# Patient Record
Sex: MALE | Race: WHITE | NOT HISPANIC OR LATINO | Employment: FULL TIME | URBAN - METROPOLITAN AREA
[De-identification: names, ages, dates, MRNs, and addresses within clinical notes are randomized per-mention and may not be internally consistent; named-entity substitution may affect disease eponyms.]

---

## 2024-08-15 ENCOUNTER — APPOINTMENT (OUTPATIENT)
Dept: RADIOLOGY | Facility: MEDICAL CENTER | Age: 39
End: 2024-08-15
Attending: EMERGENCY MEDICINE
Payer: COMMERCIAL

## 2024-08-15 ENCOUNTER — HOSPITAL ENCOUNTER (EMERGENCY)
Facility: MEDICAL CENTER | Age: 39
End: 2024-08-15
Attending: EMERGENCY MEDICINE
Payer: COMMERCIAL

## 2024-08-15 VITALS
HEART RATE: 73 BPM | RESPIRATION RATE: 16 BRPM | DIASTOLIC BLOOD PRESSURE: 90 MMHG | BODY MASS INDEX: 34.78 KG/M2 | WEIGHT: 221.56 LBS | SYSTOLIC BLOOD PRESSURE: 146 MMHG | HEIGHT: 67 IN | TEMPERATURE: 97 F | OXYGEN SATURATION: 97 %

## 2024-08-15 DIAGNOSIS — R10.84 GENERALIZED ABDOMINAL PAIN: ICD-10-CM

## 2024-08-15 LAB
ALBUMIN SERPL BCP-MCNC: 4 G/DL (ref 3.2–4.9)
ALBUMIN/GLOB SERPL: 1.5 G/DL
ALP SERPL-CCNC: 70 U/L (ref 30–99)
ALT SERPL-CCNC: 45 U/L (ref 2–50)
ANION GAP SERPL CALC-SCNC: 8 MMOL/L (ref 7–16)
AST SERPL-CCNC: 28 U/L (ref 12–45)
BASOPHILS # BLD AUTO: 0.6 % (ref 0–1.8)
BASOPHILS # BLD: 0.04 K/UL (ref 0–0.12)
BILIRUB SERPL-MCNC: 0.7 MG/DL (ref 0.1–1.5)
BUN SERPL-MCNC: 12 MG/DL (ref 8–22)
CALCIUM ALBUM COR SERPL-MCNC: 8.6 MG/DL (ref 8.5–10.5)
CALCIUM SERPL-MCNC: 8.6 MG/DL (ref 8.5–10.5)
CHLORIDE SERPL-SCNC: 108 MMOL/L (ref 96–112)
CO2 SERPL-SCNC: 23 MMOL/L (ref 20–33)
CREAT SERPL-MCNC: 0.95 MG/DL (ref 0.5–1.4)
EOSINOPHIL # BLD AUTO: 0.18 K/UL (ref 0–0.51)
EOSINOPHIL NFR BLD: 2.8 % (ref 0–6.9)
ERYTHROCYTE [DISTWIDTH] IN BLOOD BY AUTOMATED COUNT: 41.8 FL (ref 35.9–50)
GFR SERPLBLD CREATININE-BSD FMLA CKD-EPI: 105 ML/MIN/1.73 M 2
GLOBULIN SER CALC-MCNC: 2.7 G/DL (ref 1.9–3.5)
GLUCOSE SERPL-MCNC: 94 MG/DL (ref 65–99)
HCT VFR BLD AUTO: 42.8 % (ref 42–52)
HGB BLD-MCNC: 14.2 G/DL (ref 14–18)
IMM GRANULOCYTES # BLD AUTO: 0.01 K/UL (ref 0–0.11)
IMM GRANULOCYTES NFR BLD AUTO: 0.2 % (ref 0–0.9)
LIPASE SERPL-CCNC: 43 U/L (ref 11–82)
LYMPHOCYTES # BLD AUTO: 2.76 K/UL (ref 1–4.8)
LYMPHOCYTES NFR BLD: 42.8 % (ref 22–41)
MCH RBC QN AUTO: 27.8 PG (ref 27–33)
MCHC RBC AUTO-ENTMCNC: 33.2 G/DL (ref 32.3–36.5)
MCV RBC AUTO: 83.8 FL (ref 81.4–97.8)
MONOCYTES # BLD AUTO: 0.75 K/UL (ref 0–0.85)
MONOCYTES NFR BLD AUTO: 11.6 % (ref 0–13.4)
NEUTROPHILS # BLD AUTO: 2.71 K/UL (ref 1.82–7.42)
NEUTROPHILS NFR BLD: 42 % (ref 44–72)
NRBC # BLD AUTO: 0 K/UL
NRBC BLD-RTO: 0 /100 WBC (ref 0–0.2)
PLATELET # BLD AUTO: 226 K/UL (ref 164–446)
PMV BLD AUTO: 10.9 FL (ref 9–12.9)
POTASSIUM SERPL-SCNC: 3.9 MMOL/L (ref 3.6–5.5)
PROT SERPL-MCNC: 6.7 G/DL (ref 6–8.2)
RBC # BLD AUTO: 5.11 M/UL (ref 4.7–6.1)
SODIUM SERPL-SCNC: 139 MMOL/L (ref 135–145)
WBC # BLD AUTO: 6.5 K/UL (ref 4.8–10.8)

## 2024-08-15 PROCEDURE — 99284 EMERGENCY DEPT VISIT MOD MDM: CPT

## 2024-08-15 PROCEDURE — 85025 COMPLETE CBC W/AUTO DIFF WBC: CPT

## 2024-08-15 PROCEDURE — 36415 COLL VENOUS BLD VENIPUNCTURE: CPT

## 2024-08-15 PROCEDURE — 83690 ASSAY OF LIPASE: CPT

## 2024-08-15 PROCEDURE — 80053 COMPREHEN METABOLIC PANEL: CPT

## 2024-08-15 PROCEDURE — 700117 HCHG RX CONTRAST REV CODE 255: Performed by: EMERGENCY MEDICINE

## 2024-08-15 PROCEDURE — 74177 CT ABD & PELVIS W/CONTRAST: CPT

## 2024-08-15 RX ADMIN — IOHEXOL 100 ML: 350 INJECTION, SOLUTION INTRAVENOUS at 09:59

## 2024-08-15 NOTE — ED TRIAGE NOTES
Chief Complaint   Patient presents with    Abdominal Pain     Ambulated in triage reports diffuse abdominal pain x 1 year, describes as sharp, constant, gassy and bloated. Pain worse the last 2 months. +nausea -vomiting. Denies drinking alcohol.     NAD. Denies any past medical history. Educated on triage process. Instructed to notify staff for any worsening symptoms. Denies any recent travel. Denies exposure to known covid positive patients. Denies any respiratory symptoms.

## 2024-08-15 NOTE — ED PROVIDER NOTES
"ED PHYSICIAN NOTE    CHIEF COMPLAINT  Chief Complaint   Patient presents with    Abdominal Pain     Ambulated in triage reports diffuse abdominal pain x 1 year, describes as sharp, constant, gassy and bloated. Pain worse the last 2 months. +nausea -vomiting. Denies drinking alcohol.       EXTERNAL RECORDS REVIEWED  External ED Note patient seen at outside hospital with gastritis, chronic abdominal pain, constipation    \[facility] [place] [facility]\""/NIK Nuñez is a 39 y.o. male who presents with pain.  Patient has had off-and-on pain in his abdomen for his whole life.  He states that over the last 2 months the pain has gotten worse.  He has constant bloating and gas sensation.  He has nausea without vomiting.  He will have off-and-on diarrhea.  Yesterday he had 5 episodes of nonbloody diarrhea.  He has not had a fever or chills.  He has noticed that milk, cheese, yogurt, energy drink and meat make his pain worse.  Nothing in particular improves it when he has it but it does wax and wane.  He has not had a fever or chills.  No dysuria hematuria frequency.    PAST MEDICAL HISTORY  History reviewed. No pertinent past medical history.    SOCIAL HISTORY  Social History     Tobacco Use    Smoking status: Never    Smokeless tobacco: Never   Vaping Use    Vaping status: Never Used   Substance Use Topics    Alcohol use: Never    Drug use: Never       CURRENT MEDICATIONS  Home Medications       Reviewed by Ness Erazo R.N. (Registered Nurse) on 08/15/24 at 0807  Med List Status: Complete     Medication Last Dose Status        Patient Raza Taking any Medications                           ALLERGIES  No Known Allergies    PHYSICAL EXAM  VITAL SIGNS: BP (!) 146/90   Pulse 73   Temp 36.1 °C (97 °F) (Temporal)   Resp 16   Ht 1.702 m (5' 7\")   Wt 101 kg (221 lb 9 oz)   SpO2 97%   BMI 34.70 kg/m²    Constitutional: Awake and alert  HENT: Normal inspection  Eyes: Normal inspection  Neck: Grossly normal range of " motion.  Cardiovascular: Normal heart rate, Normal rhythm.  Symmetric peripheral pulses.   Thorax & Lungs: No respiratory distress, No wheezing, No rales, No rhonchi, No chest tenderness.   Abdomen: Bowel sounds normal, soft, non-distended, diffuse tenderness.  No rebound or peritonitis  Skin: No obvious rash.  Back: No tenderness, No CVA tenderness.   Extremities: No clubbing, cyanosis, edema, no Homans or cords.  Neurologic: Grossly normal   Psychiatric: Normal for situation     DIAGNOSTIC STUDIES / PROCEDURES  LABS/EKG  Results for orders placed or performed during the hospital encounter of 08/15/24   CBC WITH DIFFERENTIAL   Result Value Ref Range    WBC 6.5 4.8 - 10.8 K/uL    RBC 5.11 4.70 - 6.10 M/uL    Hemoglobin 14.2 14.0 - 18.0 g/dL    Hematocrit 42.8 42.0 - 52.0 %    MCV 83.8 81.4 - 97.8 fL    MCH 27.8 27.0 - 33.0 pg    MCHC 33.2 32.3 - 36.5 g/dL    RDW 41.8 35.9 - 50.0 fL    Platelet Count 226 164 - 446 K/uL    MPV 10.9 9.0 - 12.9 fL    Neutrophils-Polys 42.00 (L) 44.00 - 72.00 %    Lymphocytes 42.80 (H) 22.00 - 41.00 %    Monocytes 11.60 0.00 - 13.40 %    Eosinophils 2.80 0.00 - 6.90 %    Basophils 0.60 0.00 - 1.80 %    Immature Granulocytes 0.20 0.00 - 0.90 %    Nucleated RBC 0.00 0.00 - 0.20 /100 WBC    Neutrophils (Absolute) 2.71 1.82 - 7.42 K/uL    Lymphs (Absolute) 2.76 1.00 - 4.80 K/uL    Monos (Absolute) 0.75 0.00 - 0.85 K/uL    Eos (Absolute) 0.18 0.00 - 0.51 K/uL    Baso (Absolute) 0.04 0.00 - 0.12 K/uL    Immature Granulocytes (abs) 0.01 0.00 - 0.11 K/uL    NRBC (Absolute) 0.00 K/uL   COMP METABOLIC PANEL   Result Value Ref Range    Sodium 139 135 - 145 mmol/L    Potassium 3.9 3.6 - 5.5 mmol/L    Chloride 108 96 - 112 mmol/L    Co2 23 20 - 33 mmol/L    Anion Gap 8.0 7.0 - 16.0    Glucose 94 65 - 99 mg/dL    Bun 12 8 - 22 mg/dL    Creatinine 0.95 0.50 - 1.40 mg/dL    Calcium 8.6 8.5 - 10.5 mg/dL    Correct Calcium 8.6 8.5 - 10.5 mg/dL    AST(SGOT) 28 12 - 45 U/L    ALT(SGPT) 45 2 - 50 U/L     Alkaline Phosphatase 70 30 - 99 U/L    Total Bilirubin 0.7 0.1 - 1.5 mg/dL    Albumin 4.0 3.2 - 4.9 g/dL    Total Protein 6.7 6.0 - 8.2 g/dL    Globulin 2.7 1.9 - 3.5 g/dL    A-G Ratio 1.5 g/dL   LIPASE   Result Value Ref Range    Lipase 43 11 - 82 U/L   ESTIMATED GFR   Result Value Ref Range    GFR (CKD-EPI) 105 >60 mL/min/1.73 m 2        RADIOLOGY  I have independently interpreted the diagnostic imaging associated with this visit and am waiting the final reading from the radiologist.   My preliminary interpretation is as follows: CT abdomen without acute findings.  Renal cyst noted.  Patient is aware of these.    COURSE & MEDICAL DECISION MAKING    INITIAL ASSESSMENT, COURSE AND PLAN  Care Narrative: Patient presents with vague ongoing abdominal pain.  It appears that he has had abdominal pain off and on for quite some time.  He does note that it is postprandial mostly after milk products.  His abdomen was not focally tender in the right upper quadrant.  He does not have fever.  Does have a history of abdominal surgery.  Will obtain CT scan of the abdomen and pelvis and laboratory data.    Diagnostic testing returned reassuring.  No leukocytosis or left shift.  Chemistries normal including LFTs and lipase.  He does not have urinary symptoms.  CT scan of the abdomen and pelvis was negative for acute findings.    At this point etiology of patient's pain is not defined.  May be lactose intolerance given the description of worsening factors.  Advise discontinuation.  Given chronic abdominal pain he should see a gastroenterologist for further assessment.  Have also advised a daily antacid.  Patient discharged to return to the ER for any fevers, focal pain, worsening, not improving or concern.    Interventions  Medications   iohexol (OMNIPAQUE) 350 mg/mL (IV) (100 mL Intravenous Given 8/15/24 8567)       DISPOSITION AND DISCUSSIONS    Barriers to care at this time, including but not limited to: Patient does not have  established PCP.     Prescription drugs considered and/or prescribed:   Considered opiate prescription, but nonnarcotic analgesic is most appropriate      Follow up  [address]  [place] [address]  [phone]  Schedule an appointment as soon as possible for a visit       FINAL IMPRESSION  1.  Abdominal pain    This dictation was created using voice recognition software. The accuracy of the dictation is limited to the abilities of the software. I expect there may be some errors of grammar and possibly content. The nursing notes were reviewed and certain aspects of this information were incorporated into this note.    Electronically signed by: Chuy Chung M.D., 8/15/2024

## 2025-04-10 ENCOUNTER — HOSPITAL ENCOUNTER (EMERGENCY)
Facility: MEDICAL CENTER | Age: 40
End: 2025-04-11
Payer: COMMERCIAL

## 2025-04-10 VITALS
HEIGHT: 67 IN | WEIGHT: 227.51 LBS | OXYGEN SATURATION: 95 % | TEMPERATURE: 97.3 F | SYSTOLIC BLOOD PRESSURE: 156 MMHG | BODY MASS INDEX: 35.71 KG/M2 | RESPIRATION RATE: 16 BRPM | DIASTOLIC BLOOD PRESSURE: 104 MMHG | HEART RATE: 85 BPM

## 2025-04-10 LAB
ALBUMIN SERPL BCP-MCNC: 4.3 G/DL (ref 3.2–4.9)
ALBUMIN/GLOB SERPL: 1.3 G/DL
ALP SERPL-CCNC: 76 U/L (ref 30–99)
ALT SERPL-CCNC: 45 U/L (ref 2–50)
ANION GAP SERPL CALC-SCNC: 10 MMOL/L (ref 7–16)
AST SERPL-CCNC: 33 U/L (ref 12–45)
BASOPHILS # BLD AUTO: 0.6 % (ref 0–1.8)
BASOPHILS # BLD: 0.05 K/UL (ref 0–0.12)
BILIRUB SERPL-MCNC: 0.6 MG/DL (ref 0.1–1.5)
BUN SERPL-MCNC: 12 MG/DL (ref 8–22)
CALCIUM ALBUM COR SERPL-MCNC: 9 MG/DL (ref 8.5–10.5)
CALCIUM SERPL-MCNC: 9.2 MG/DL (ref 8.5–10.5)
CHLORIDE SERPL-SCNC: 107 MMOL/L (ref 96–112)
CO2 SERPL-SCNC: 22 MMOL/L (ref 20–33)
CREAT SERPL-MCNC: 1.05 MG/DL (ref 0.5–1.4)
EKG IMPRESSION: NORMAL
EOSINOPHIL # BLD AUTO: 0.12 K/UL (ref 0–0.51)
EOSINOPHIL NFR BLD: 1.4 % (ref 0–6.9)
ERYTHROCYTE [DISTWIDTH] IN BLOOD BY AUTOMATED COUNT: 41.8 FL (ref 35.9–50)
GFR SERPLBLD CREATININE-BSD FMLA CKD-EPI: 93 ML/MIN/1.73 M 2
GLOBULIN SER CALC-MCNC: 3.2 G/DL (ref 1.9–3.5)
GLUCOSE SERPL-MCNC: 96 MG/DL (ref 65–99)
HCT VFR BLD AUTO: 46.1 % (ref 42–52)
HGB BLD-MCNC: 15.3 G/DL (ref 14–18)
IMM GRANULOCYTES # BLD AUTO: 0.02 K/UL (ref 0–0.11)
IMM GRANULOCYTES NFR BLD AUTO: 0.2 % (ref 0–0.9)
LIPASE SERPL-CCNC: 38 U/L (ref 11–82)
LYMPHOCYTES # BLD AUTO: 3.33 K/UL (ref 1–4.8)
LYMPHOCYTES NFR BLD: 39.8 % (ref 22–41)
MCH RBC QN AUTO: 27.6 PG (ref 27–33)
MCHC RBC AUTO-ENTMCNC: 33.2 G/DL (ref 32.3–36.5)
MCV RBC AUTO: 83.1 FL (ref 81.4–97.8)
MONOCYTES # BLD AUTO: 0.67 K/UL (ref 0–0.85)
MONOCYTES NFR BLD AUTO: 8 % (ref 0–13.4)
NEUTROPHILS # BLD AUTO: 4.18 K/UL (ref 1.82–7.42)
NEUTROPHILS NFR BLD: 50 % (ref 44–72)
NRBC # BLD AUTO: 0 K/UL
NRBC BLD-RTO: 0 /100 WBC (ref 0–0.2)
PLATELET # BLD AUTO: 251 K/UL (ref 164–446)
PMV BLD AUTO: 10.4 FL (ref 9–12.9)
POTASSIUM SERPL-SCNC: 3.7 MMOL/L (ref 3.6–5.5)
PROT SERPL-MCNC: 7.5 G/DL (ref 6–8.2)
RBC # BLD AUTO: 5.55 M/UL (ref 4.7–6.1)
SODIUM SERPL-SCNC: 139 MMOL/L (ref 135–145)
TROPONIN T SERPL-MCNC: 8 NG/L (ref 6–19)
WBC # BLD AUTO: 8.4 K/UL (ref 4.8–10.8)

## 2025-04-10 PROCEDURE — 302449 STATCHG TRIAGE ONLY (STATISTIC)

## 2025-04-10 PROCEDURE — 83690 ASSAY OF LIPASE: CPT

## 2025-04-10 PROCEDURE — 84484 ASSAY OF TROPONIN QUANT: CPT

## 2025-04-10 PROCEDURE — 85025 COMPLETE CBC W/AUTO DIFF WBC: CPT

## 2025-04-10 PROCEDURE — 80053 COMPREHEN METABOLIC PANEL: CPT

## 2025-04-10 PROCEDURE — 93005 ELECTROCARDIOGRAM TRACING: CPT | Mod: TC

## 2025-04-10 ASSESSMENT — PAIN DESCRIPTION - PAIN TYPE: TYPE: ACUTE PAIN

## 2025-04-10 ASSESSMENT — FIBROSIS 4 INDEX: FIB4 SCORE: 0.7

## 2025-04-11 NOTE — ED TRIAGE NOTES
"Chief Complaint   Patient presents with    Abdominal Pain     Pt has had ongoing pain for months now.  Pt obtained a PCP, has had CT, on protonix, and was diagnosed with fatty liver.  Pt has changed his diet and been compliant with meds but he is still having constant pain, having pale stools x2 weeks, and nothing is giving him relief.         Patient to triage ambulatory with a steady gait, AAOx4, Appropriate precautions in place.   Pt expressing frustration, stating he has been to several hospitals over last 2 weeks and the pain is not resolving.    Explained wait time and triage process. Placed in phleb waiting for blood draw. Told to notify ED tech or RN of any changes, verbalized understanding.    BP (!) 156/104   Pulse 85   Temp 36.3 °C (97.3 °F) (Temporal)   Resp 16   Ht 1.702 m (5' 7\")   Wt 103 kg (227 lb 8.2 oz)   SpO2 95%   BMI 35.63 kg/m²     "

## 2025-05-20 ENCOUNTER — PATIENT MESSAGE (OUTPATIENT)
Dept: MEDICAL GROUP | Facility: CLINIC | Age: 40
End: 2025-05-20
Payer: COMMERCIAL

## 2025-05-20 PROBLEM — K76.0 FATTY LIVER: Status: ACTIVE | Noted: 2025-05-20
